# Patient Record
Sex: MALE | Race: WHITE | Employment: OTHER | URBAN - METROPOLITAN AREA
[De-identification: names, ages, dates, MRNs, and addresses within clinical notes are randomized per-mention and may not be internally consistent; named-entity substitution may affect disease eponyms.]

---

## 2019-03-17 ENCOUNTER — TRANSFERRED RECORDS (OUTPATIENT)
Dept: HEALTH INFORMATION MANAGEMENT | Facility: CLINIC | Age: 64
End: 2019-03-17

## 2019-03-28 ENCOUNTER — TRANSFERRED RECORDS (OUTPATIENT)
Dept: HEALTH INFORMATION MANAGEMENT | Facility: CLINIC | Age: 64
End: 2019-03-28

## 2019-05-15 ENCOUNTER — TRANSFERRED RECORDS (OUTPATIENT)
Dept: HEALTH INFORMATION MANAGEMENT | Facility: CLINIC | Age: 64
End: 2019-05-15

## 2019-05-18 ENCOUNTER — TRANSFERRED RECORDS (OUTPATIENT)
Dept: HEALTH INFORMATION MANAGEMENT | Facility: CLINIC | Age: 64
End: 2019-05-18

## 2019-05-19 ENCOUNTER — TRANSFERRED RECORDS (OUTPATIENT)
Dept: HEALTH INFORMATION MANAGEMENT | Facility: CLINIC | Age: 64
End: 2019-05-19

## 2019-05-20 ENCOUNTER — TRANSFERRED RECORDS (OUTPATIENT)
Dept: HEALTH INFORMATION MANAGEMENT | Facility: CLINIC | Age: 64
End: 2019-05-20

## 2019-05-21 ENCOUNTER — TRANSFERRED RECORDS (OUTPATIENT)
Dept: HEALTH INFORMATION MANAGEMENT | Facility: CLINIC | Age: 64
End: 2019-05-21

## 2019-05-22 ENCOUNTER — TRANSFERRED RECORDS (OUTPATIENT)
Dept: HEALTH INFORMATION MANAGEMENT | Facility: CLINIC | Age: 64
End: 2019-05-22

## 2019-05-23 ENCOUNTER — TRANSFERRED RECORDS (OUTPATIENT)
Dept: HEALTH INFORMATION MANAGEMENT | Facility: CLINIC | Age: 64
End: 2019-05-23

## 2019-05-24 ENCOUNTER — TRANSFERRED RECORDS (OUTPATIENT)
Dept: HEALTH INFORMATION MANAGEMENT | Facility: CLINIC | Age: 64
End: 2019-05-24

## 2019-05-28 ENCOUNTER — TRANSFERRED RECORDS (OUTPATIENT)
Dept: HEALTH INFORMATION MANAGEMENT | Facility: CLINIC | Age: 64
End: 2019-05-28

## 2019-05-30 ENCOUNTER — TRANSFERRED RECORDS (OUTPATIENT)
Dept: HEALTH INFORMATION MANAGEMENT | Facility: CLINIC | Age: 64
End: 2019-05-30

## 2019-05-31 ENCOUNTER — TRANSFERRED RECORDS (OUTPATIENT)
Dept: HEALTH INFORMATION MANAGEMENT | Facility: CLINIC | Age: 64
End: 2019-05-31

## 2019-06-25 ENCOUNTER — TRANSFERRED RECORDS (OUTPATIENT)
Dept: HEALTH INFORMATION MANAGEMENT | Facility: CLINIC | Age: 64
End: 2019-06-25

## 2019-07-17 ENCOUNTER — TRANSFERRED RECORDS (OUTPATIENT)
Dept: HEALTH INFORMATION MANAGEMENT | Facility: CLINIC | Age: 64
End: 2019-07-17

## 2019-07-18 NOTE — TELEPHONE ENCOUNTER
ONCOLOGY INTAKE: Records Information      APPT INFORMATION:  Referring provider:  self  Referring provider s clinic:  n/a  Reason for visit/diagnosis:  Prostate Cancer  Has patient been notified of appointment date and time?: Yes  RECORDS INFORMATION:  Were the records received with the referral (via Rightfax)? No    Has patient been seen for any external appt for this diagnosis? Yes    If yes, where? [facility]    Has patient had any imaging or procedures outside of [facility] for this condition? Yes      If Yes, where? [facility]    ADDITIONAL INFORMATION:  Waiting for fax so Chad can be put on the high priority wait list. He also had radiation here with Dr. Kerr.

## 2019-07-19 ENCOUNTER — TRANSFERRED RECORDS (OUTPATIENT)
Dept: HEALTH INFORMATION MANAGEMENT | Facility: CLINIC | Age: 64
End: 2019-07-19

## 2019-07-19 NOTE — TELEPHONE ENCOUNTER
RECORDS STATUS - ALL OTHER DIAGNOSIS      RECORDS RECEIVED FROM: [facility]   DATE RECEIVED: 08/02/2019   NOTES STATUS DETAILS   OFFICE NOTE from referring provider YES SENT TO SCANNING   OFFICE NOTE from medical oncologist YES    DISCHARGE SUMMARY from hospital NA    DISCHARGE REPORT from the ER NA    OPERATIVE REPORT NA    MEDICATION LIST YES SENT TO SCANNING   CLINICAL TRIAL TREATMENTS TO DATE NA    LABS YES    PATHOLOGY REPORTS PENDING    ANYTHING RELATED TO DIAGNOSIS NA    GENONOMIC TESTING NA    TYPE:     IMAGING (NEED IMAGES & REPORT) YES    CT SCANS YES PACS   MRI YES PACS   MAMMO NA    ULTRASOUND YES PACS   PET YES PACS     Action VERONICA   Action Taken REQUEST SENT FOR MR AND IMAGING AND BX. CDK

## 2019-07-22 ENCOUNTER — TRANSFERRED RECORDS (OUTPATIENT)
Dept: HEALTH INFORMATION MANAGEMENT | Facility: CLINIC | Age: 64
End: 2019-07-22

## 2019-07-23 ENCOUNTER — TRANSFERRED RECORDS (OUTPATIENT)
Dept: HEALTH INFORMATION MANAGEMENT | Facility: CLINIC | Age: 64
End: 2019-07-23

## 2019-07-26 ENCOUNTER — DOCUMENTATION ONLY (OUTPATIENT)
Dept: ONCOLOGY | Facility: CLINIC | Age: 64
End: 2019-07-26

## 2019-07-26 NOTE — PROGRESS NOTES
Patient saw Dr. Sherwood in 2013 and requested to see him again.    I scheduled him with Dr. Sherwood  on 9/4/19. Do I need to reschedule him to see someone else?  I did offer other doctors, but he was very adamant that he wanted to see Dr. Sherwood.    Please let me know what you would like me to do.

## 2019-08-01 ENCOUNTER — PRE VISIT (OUTPATIENT)
Dept: ONCOLOGY | Facility: CLINIC | Age: 64
End: 2019-08-01

## 2019-08-01 NOTE — TELEPHONE ENCOUNTER
ONCOLOGY INTAKE: Records Information      APPT INFORMATION:  Referring provider:  SHIVA  Referring provider s clinic:  NA  Reason for visit/diagnosis:  2nd opinion prostate cancer  Has patient been notified of appointment date and time?: Per PT    RECORDS INFORMATION:  Were the records received with the referral (via Rightfax)? No    Has patient been seen for any external appt for this diagnosis? Per PT:    [facility]    Urology [facility]  Surgeries from side affects from [facility] - Prostate surgery  [place]    Previous PT of [facility]  ADDITIONAL INFORMATION:  NA

## 2019-08-02 ENCOUNTER — TRANSFERRED RECORDS (OUTPATIENT)
Dept: HEALTH INFORMATION MANAGEMENT | Facility: CLINIC | Age: 64
End: 2019-08-02

## 2019-08-05 ENCOUNTER — TRANSFERRED RECORDS (OUTPATIENT)
Dept: HEALTH INFORMATION MANAGEMENT | Facility: CLINIC | Age: 64
End: 2019-08-05

## 2019-08-07 ENCOUNTER — ONCOLOGY VISIT (OUTPATIENT)
Dept: ONCOLOGY | Facility: CLINIC | Age: 64
End: 2019-08-07
Attending: INTERNAL MEDICINE
Payer: MEDICARE

## 2019-08-07 VITALS
RESPIRATION RATE: 16 BRPM | BODY MASS INDEX: 29.78 KG/M2 | DIASTOLIC BLOOD PRESSURE: 78 MMHG | HEIGHT: 70 IN | TEMPERATURE: 98.3 F | WEIGHT: 208 LBS | SYSTOLIC BLOOD PRESSURE: 190 MMHG | HEART RATE: 85 BPM | OXYGEN SATURATION: 94 %

## 2019-08-07 DIAGNOSIS — C61 MALIGNANT NEOPLASM OF PROSTATE (H): Primary | ICD-10-CM

## 2019-08-07 DIAGNOSIS — C79.51 BONE METASTASIS: ICD-10-CM

## 2019-08-07 PROBLEM — S41.119A: Status: ACTIVE | Noted: 2018-08-19

## 2019-08-07 PROCEDURE — G0463 HOSPITAL OUTPT CLINIC VISIT: HCPCS | Mod: ZF

## 2019-08-07 PROCEDURE — 99205 OFFICE O/P NEW HI 60 MIN: CPT | Mod: ZP | Performed by: INTERNAL MEDICINE

## 2019-08-07 RX ORDER — LORAZEPAM 1 MG/1
0.5 TABLET ORAL EVERY 6 HOURS PRN
COMMUNITY

## 2019-08-07 RX ORDER — CARISOPRODOL 250 MG/1
250 TABLET ORAL 4 TIMES DAILY PRN
COMMUNITY

## 2019-08-07 RX ORDER — ALBUTEROL SULFATE 90 UG/1
2 AEROSOL, METERED RESPIRATORY (INHALATION)
COMMUNITY
Start: 2018-01-15

## 2019-08-07 RX ORDER — HYDROCODONE BITARTRATE AND ACETAMINOPHEN 5; 325 MG/1; MG/1
1 TABLET ORAL EVERY 6 HOURS PRN
COMMUNITY

## 2019-08-07 ASSESSMENT — PAIN SCALES - GENERAL: PAINLEVEL: NO PAIN (0)

## 2019-08-07 ASSESSMENT — MIFFLIN-ST. JEOR: SCORE: 1738.7

## 2019-08-07 NOTE — PATIENT INSTRUCTIONS
Colorectal Cancer Screening: During our visit today, we discussed that it is recommended you receive colorectal cancer screening. Please call or make an appointment with your primary care provider to discuss this. You may also call the [facility] scheduling line ([phone]) to set up a colonoscopy appointment.

## 2019-08-07 NOTE — PROGRESS NOTES
"MEDICAL ONCOLOGY CLINIC NOTE    PATIENT NAME: Zacarias Villalta  ENCOUNTER DATE: 8/7/2019  REFERRING PROVIDER: Dr. Matta ([facility], [place], [place])    REASON FOR CURRENT VISIT: Recurrent prostate cancer    HISTORY OF PRESENT ILLNESS:  Mr. Zacarias Villalta is a 64 year old  male referred by Dr. Matta from medical oncology at [facility] in [place], [place] for a diagnosis of recurrent prostate cancer. His oncologic history is as under.     Zacarias has significant symptom burden from the prior local treatments for his prostate cancer.  No clear evidence of symptoms from the recurrent disease including worsening chronic back pain or new areas of bone pain. Energy and appetite have been stable. He denies any unintentional weight changes. No cough or SOB. No recent fevers or infectious symptoms. He is very anxious at baseline and has difficulty talking about \"numbers\", particularly in regard to prognosis or survival. His main reason for seeking today's second opinion consultation visit was to discuss if we had any clinical trials or non-testosterone depleting therapies available for him.     ONCOLOGIC HISTORY:  He was first found to have an elevated PSA of 12 in 2008, in the setting of nocturia with burning. He was treated with antibiotics. He was referred for prostate biopsy later in 2008 when PSA evaristo to 10.3. Biopsy revealed Victoria 3+4 prostate adenocarcinoma, with bilateral involvement and perinodal invasion (clinically T2cN0, per notes). He declined CT and bone scan, but underwent prostatectomy in July 2008, notable for lymphovascular invasion and bilateral seminal vesicle involvement, no extracapsular tumor extension, margins clear, but metastatic carcinoma was found in one right pelvic lymph node ([id number]). PSA after surgery was 1.57 and Lupron was recommended. He did not start this until November 2008. Salvage radiation was done in January 2009. 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 He completed 2 years of Lupron, but did not tolerate well due to side effects including ED. He had evidence of good response based on an undetectable PSA for several years. However, MRI of the spine in May 2019 after a fall showed multilevel abnormal bone marrow signal C2 and T4-5. He was first referred to hematology and work-up did not show any obvious evidence of lymphoma or myeloma. He was then seen by his local oncologist on 7/17/19, at which time PSA was found to be 391. Further work-up included a bone scan on 8/2/19 with multifocal sites of active metastatic disease in the axial skeleton (most prominently in the proximal right humerus and proximal right femur), as well as CT abd/pelvis on 8/5/19 which revealed evidence only of prior radical prostatectomy. Biopsy of L1 on 8/2/19 demonstrated metastatic prostate adenocarcinoma.    Per review of the notes from his most recent oncology visit, it appears that he was not interested in resumption of ADT due to side effects. As such, it was felt that the effectiveness of oral agents such as Zytiga or Xtandi would be lower. Chemotherapy was discussed, but he was not clearly interested in that either.     REVIEW OF SYSTEMS:  14 point ROS negative other than the symptoms noted above in the HPI.    PAST MEDICAL HISTORY:  Active Ambulatory Problems     Diagnosis Date Noted     Erectile dysfunction 03/08/2012     Absent nipple 03/27/2012     Skin tear of upper arm without complication, initial encounter 08/19/2018     Gamekeeper's thumb 08/21/2012     Essential hypertension 08/04/2006     Resolved Ambulatory Problems     Diagnosis Date Noted     No Resolved Ambulatory Problems     No Additional Past Medical History     PAST SURGICAL HISTORY:     Appendectomy in 1972    Radical prostatectomy in 2008    Tendon repair in 2011    SOCIAL HISTORY:   Social History     Tobacco Use     Smoking status: Former  "Smoker     Smokeless tobacco: Never Used   Substance Use Topics     Alcohol use: Not Currently     Drug use: Not Currently   - 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     FAMILY HISTORY:   Father with coronary artery disease. Two brothers with stroke.    ALLERGIES:  Allergies   Allergen Reactions     Bee Venom      Codeine Sulfate      Levofloxacin Itching     Penicillins      CURRENT MEDICATIONS:  Current Outpatient Medications:      albuterol (PROAIR HFA/PROVENTIL HFA/VENTOLIN HFA) 108 (90 Base) MCG/ACT inhaler, Inhale 2 puffs into the lungs, Disp: , Rfl:      Carisoprodol 250 MG TABS, Take 250 mg by mouth 4 times daily as needed, Disp: , Rfl:      Co-Enzyme Q-10 100 MG CAPS, Take 100 mg by mouth, Disp: , Rfl:      FISH OIL, 1 capsule daily., Disp: , Rfl:      hydrochlorothiazide (HYDRODIURIL) 25 MG tablet, Take 25 mg by mouth daily., Disp: , Rfl:      HYDROcodone-acetaminophen (NORCO) 5-325 MG tablet, Take 1 tablet by mouth every 6 hours as needed for severe pain, Disp: , Rfl:      LORazepam (ATIVAN) 1 MG tablet, Take 0.5 mg by mouth every 6 hours as needed for anxiety, Disp: , Rfl:     PHYSICAL EXAMINATION:  Vital signs: BP (!) 190/78   Pulse 85   Temp 98.3  F (36.8  C) (Oral)   Resp 16   Ht 1.784 m (5' 10.25\")   Wt 94.3 kg (208 lb)   SpO2 94%   BMI 29.63 kg/m    ECOG performance status of 0. Fatigue 0.  GENERAL: Well-nourished healthy-appearing man, seated in chair, no acute distress.   HEENT: No icterus, no pallor. Moist mucous membranes. Oropharynx is clear.   NECK: Supple, no JVD/LAD.  LUNGS: Clear to ausculation bilaterally, normal work of breathing.   CARDIOVASCULAR: Regular rate and rhythm, no murmurs, gallops or rubs.   ABDOMEN: Soft, nontender and nondistended, no palpable masses, bowel sounds present.  EXTREMITIES: No cyanosis, no clubbing, no edema.   NEUROLOGIC: Alert and oriented. No focal deficits.  PSYCH: Anxious mood but appropriate affect.    LABORATORY DATA: " "  .85 on 7/17/19 (Clear)    Surgical Pathology from L1 biopsy, 8/2/19 (done locally):  - Metastatic prostate adenocarcinoma    IMAGING STUDIES:  Reviewed imaging obtained locally, most recently on 8/2/19 and 8/5/19.  - NM Bone scan notable for multifocal metastatic disease, primarily the proximal right humerus and proximal right femur.  - CT abd/pelvis did not show any evidence of distant metastatic disease.    ASSESSMENT AND PLAN:  Mr. Zacarias Villalta is a 64 year old gentleman, who presents for a second opinion for his metastatic prostate cancer diagnosis.  - He was diagnosed initially with metastatic prostate cancer involving a right inguinal lymph node in 2008, s/p radical prostatectomy with Nye 3+4 prostate adenocarcinoma. He had salvage radiation in 2009. He completed 2 years of ADT with Lupron, and had a good response with PSA decline for several years, but overall tolerated this poorly due to side effects of ED. Recently found to have biopsy proven recurrence with multifocal metastatic disease involving the axial skeleton and PSA elevated to 394.    - I reviewed the available diagnostic data that strongly suggests that this represents an incurable malignancy.    - Reviewed the treatment options including androgen deprivation therapy (ADT) alone versus in combination with chemotherapy or novel antiandrogens. Also reviewed the rationale behind need for systemic therapy at length, with specific focus on its ability to prevent and/or delay symptomatic deterioration for many months if not years to come.     - Patient had many excellent questions regarding his prior experience with ADT in the context of concurrent pelvic radiation. It appears that he has misattributed many of these adverse events to ADT.     - I also addressed his concern regarding a drop in testosterone could significantly take away from his \"masculinity.\" This is simply not true as men while on ADT continue to have excellent " overall status and with due care, can retain muscle mass etc. Sexual function can be affected significantly with ADT, but he reports minimal sexual desire and function due to prior treatments even at baseline.    - Side effects of ADT and abiraterone explained in detail as patient absolutely doesn't want chemotherapy at this time. We do not have a trial available for this disease setting currently.    - He has agreed to explore one of the following three options - degarelix alone, degarelix plus abiraterone (starting 2 months after degarelix to ensure that any side effects are appropriately attributed), versus abiraterone alone. The data for the third option comes from a [facility] series presented at ASCO meeting 2019 by BRI Grajeda MD where patients did obtain sufficient serum testosterone reduction and clinical responses with abiraterone alone.    - He was encouraged to go back and meet his primary medical oncologist to start treatment ASAP. He declined to do so, but agreed to meet his PCP next week to obtain a referral for a different medical oncologist in [place].    - We will plan to send this note to his PCP's office but patient did narrate back an excellent understanding of my recommendations including the need to start anticancer therapy within the next few days.      He was given our contact information should a need arise to see me back in the future. All questions were answered and he is in complete agreement with the plan.     BILLIN. 1 hour spent in this encounter including 35 mins face-to-face time.    Iron Quinones M.D.  . Professor of Medicine  Genitourinary Oncology  Division of Hematology, Oncology & Transplantation  [facility]

## 2019-08-07 NOTE — LETTER
"8/7/2019       RE: Zacarias Villalta  [address]  [place] [address]     Dear Colleague,    Thank you for referring your patient, Zacarias Villalta, to the [facility]. Please see a copy of my visit note below.    MEDICAL ONCOLOGY CLINIC NOTE    PATIENT NAME: Zacarias Villalta  ENCOUNTER DATE: 8/7/2019  REFERRING PROVIDER: Dr. Matta ([facility], [place], [place])    REASON FOR CURRENT VISIT: Recurrent prostate cancer    HISTORY OF PRESENT ILLNESS:  Mr. Zacarias Villalta is a 64 year old  male referred by Dr. Matta from medical oncology at [facility] in [place], [place] for a diagnosis of recurrent prostate cancer. His oncologic history is as under.     Zacarias has significant symptom burden from the prior local treatments for his prostate cancer.  No clear evidence of symptoms from the recurrent disease including worsening chronic back pain or new areas of bone pain. Energy and appetite have been stable. He denies any unintentional weight changes. No cough or SOB. No recent fevers or infectious symptoms. He is very anxious at baseline and has difficulty talking about \"numbers\", particularly in regard to prognosis or survival. His main reason for seeking today's second opinion consultation visit was to discuss if we had any clinical trials or non-testosterone depleting therapies available for him.     ONCOLOGIC HISTORY:  He was first found to have an elevated PSA of 12 in 2008, in the setting of nocturia with burning. He was treated with antibiotics. He was referred for prostate biopsy later in 2008 when PSA evaristo to 10.3. Biopsy revealed Naty 3+4 prostate adenocarcinoma, with bilateral involvement and perinodal invasion (clinically T2cN0, per notes). He declined CT and bone scan, but underwent prostatectomy in July 2008, notable for lymphovascular invasion and bilateral seminal vesicle involvement, no extracapsular tumor extension, margins clear, but metastatic carcinoma was found " in one right pelvic lymph node ([id number]). PSA after surgery was 1.57 and Lupron was recommended. He did not start this until November 2008. Salvage radiation was done in January 2009. 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 He completed 2 years of Lupron, but did not tolerate well due to side effects including ED. He had evidence of good response based on an undetectable PSA for several years. However, MRI of the spine in May 2019 after a fall showed multilevel abnormal bone marrow signal C2 and T4-5. He was first referred to hematology and work-up did not show any obvious evidence of lymphoma or myeloma. He was then seen by his local oncologist on 7/17/19, at which time PSA was found to be 391. Further work-up included a bone scan on 8/2/19 with multifocal sites of active metastatic disease in the axial skeleton (most prominently in the proximal right humerus and proximal right femur), as well as CT abd/pelvis on 8/5/19 which revealed evidence only of prior radical prostatectomy. Biopsy of L1 on 8/2/19 demonstrated metastatic prostate adenocarcinoma.    Per review of the notes from his most recent oncology visit, it appears that he was not interested in resumption of ADT due to side effects. As such, it was felt that the effectiveness of oral agents such as Zytiga or Xtandi would be lower. Chemotherapy was discussed, but he was not clearly interested in that either.     REVIEW OF SYSTEMS:  14 point ROS negative other than the symptoms noted above in the HPI.    PAST MEDICAL HISTORY:  Active Ambulatory Problems     Diagnosis Date Noted     Erectile dysfunction 03/08/2012     Absent nipple 03/27/2012     Skin tear of upper arm without complication, initial encounter 08/19/2018     Gamekeeper's thumb 08/21/2012     Essential hypertension 08/04/2006     Resolved Ambulatory Problems     Diagnosis Date Noted     No Resolved  "Ambulatory Problems     No Additional Past Medical History     PAST SURGICAL HISTORY:     Appendectomy in 1972    Radical prostatectomy in 2008    Tendon repair in 2011    SOCIAL HISTORY:   Social History     Tobacco Use     Smoking status: Former Smoker     Smokeless tobacco: Never Used   Substance Use Topics     Alcohol use: Not Currently     Drug use: Not Currently   - 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     FAMILY HISTORY:   Father with coronary artery disease. Two brothers with stroke.    ALLERGIES:  Allergies   Allergen Reactions     Bee Venom      Codeine Sulfate      Levofloxacin Itching     Penicillins      CURRENT MEDICATIONS:  Current Outpatient Medications:      albuterol (PROAIR HFA/PROVENTIL HFA/VENTOLIN HFA) 108 (90 Base) MCG/ACT inhaler, Inhale 2 puffs into the lungs, Disp: , Rfl:      Carisoprodol 250 MG TABS, Take 250 mg by mouth 4 times daily as needed, Disp: , Rfl:      Co-Enzyme Q-10 100 MG CAPS, Take 100 mg by mouth, Disp: , Rfl:      FISH OIL, 1 capsule daily., Disp: , Rfl:      hydrochlorothiazide (HYDRODIURIL) 25 MG tablet, Take 25 mg by mouth daily., Disp: , Rfl:      HYDROcodone-acetaminophen (NORCO) 5-325 MG tablet, Take 1 tablet by mouth every 6 hours as needed for severe pain, Disp: , Rfl:      LORazepam (ATIVAN) 1 MG tablet, Take 0.5 mg by mouth every 6 hours as needed for anxiety, Disp: , Rfl:     PHYSICAL EXAMINATION:  Vital signs: BP (!) 190/78   Pulse 85   Temp 98.3  F (36.8  C) (Oral)   Resp 16   Ht 1.784 m (5' 10.25\")   Wt 94.3 kg (208 lb)   SpO2 94%   BMI 29.63 kg/m     ECOG performance status of 0. Fatigue 0.  GENERAL: Well-nourished healthy-appearing man, seated in chair, no acute distress.   HEENT: No icterus, no pallor. Moist mucous membranes. Oropharynx is clear.   NECK: Supple, no JVD/LAD.  LUNGS: Clear to ausculation bilaterally, normal work of breathing.   CARDIOVASCULAR: Regular rate and rhythm, no murmurs, gallops or rubs. "   ABDOMEN: Soft, nontender and nondistended, no palpable masses, bowel sounds present.  EXTREMITIES: No cyanosis, no clubbing, no edema.   NEUROLOGIC: Alert and oriented. No focal deficits.  PSYCH: Anxious mood but appropriate affect.    LABORATORY DATA:   .85 on 7/17/19 ([place])    Surgical Pathology from L1 biopsy, 8/2/19 (done locally):  - Metastatic prostate adenocarcinoma    IMAGING STUDIES:  Reviewed imaging obtained locally, most recently on 8/2/19 and 8/5/19.  - NM Bone scan notable for multifocal metastatic disease, primarily the proximal right humerus and proximal right femur.  - CT abd/pelvis did not show any evidence of distant metastatic disease.    ASSESSMENT AND PLAN:  Mr. Zacarias Villalta is a 64 year old gentleman, who presents for a second opinion for his metastatic prostate cancer diagnosis.  - He was diagnosed initially with metastatic prostate cancer involving a right inguinal lymph node in 2008, s/p radical prostatectomy with Morley 3+4 prostate adenocarcinoma. He had salvage radiation in 2009. He completed 2 years of ADT with Lupron, and had a good response with PSA decline for several years, but overall tolerated this poorly due to side effects of ED. Recently found to have biopsy proven recurrence with multifocal metastatic disease involving the axial skeleton and PSA elevated to 394.    - I reviewed the available diagnostic data that strongly suggests that this represents an incurable malignancy.    - Reviewed the treatment options including androgen deprivation therapy (ADT) alone versus in combination with chemotherapy or novel antiandrogens. Also reviewed the rationale behind need for systemic therapy at length, with specific focus on its ability to prevent and/or delay symptomatic deterioration for many months if not years to come.     - Patient had many excellent questions regarding his prior experience with ADT in the context of concurrent pelvic radiation. It appears that he  "has misattributed many of these adverse events to ADT.     - I also addressed his concern regarding a drop in testosterone could significantly take away from his \"masculinity.\" This is simply not true as men while on ADT continue to have excellent overall status and with due care, can retain muscle mass etc. Sexual function can be affected significantly with ADT, but he reports minimal sexual desire and function due to prior treatments even at baseline.    - Side effects of ADT and abiraterone explained in detail as patient absolutely doesn't want chemotherapy at this time. We do not have a trial available for this disease setting currently.    - He has agreed to explore one of the following three options - degarelix alone, degarelix plus abiraterone (starting 2 months after degarelix to ensure that any side effects are appropriately attributed), versus abiraterone alone. The data for the third option comes from a [facility] series presented at ASCO meeting 2019 by BRI Grajeda MD where patients did obtain sufficient serum testosterone reduction and clinical responses with abiraterone alone.    - He was encouraged to go back and meet his primary medical oncologist to start treatment ASAP. He declined to do so, but agreed to meet his PCP next week to obtain a referral for a different medical oncologist in [place].    - We will plan to send this note to his PCP's office but patient did narrate back an excellent understanding of my recommendations including the need to start anticancer therapy within the next few days.      He was given our contact information should a need arise to see me back in the future. All questions were answered and he is in complete agreement with the plan.     BILLIN. 1 hour spent in this encounter including 35 mins face-to-face time.    Iron Quinones M.D.  . Professor of Medicine  Genitourinary Oncology  Division of Hematology, Oncology & Transplantation  [facility]" [place]      Again, thank you for allowing me to participate in the care of your patient.      Sincerely,    Iron Quinones MD

## 2019-08-07 NOTE — NURSING NOTE
"Oncology Rooming Note    August 7, 2019 4:01 PM   Zacarias Villalta is a 64 year old male who presents for:    Chief Complaint   Patient presents with     Oncology Clinic Visit     New; Prostate Ca     Initial Vitals: BP (!) 190/78   Pulse 85   Temp 98.3  F (36.8  C) (Oral)   Resp 16   Ht 1.784 m (5' 10.25\")   Wt 94.3 kg (208 lb)   SpO2 94%   BMI 29.63 kg/m   Estimated body mass index is 29.63 kg/m  as calculated from the following:    Height as of this encounter: 1.784 m (5' 10.25\").    Weight as of this encounter: 94.3 kg (208 lb). Body surface area is 2.16 meters squared.  No Pain (0) Comment: Data Unavailable   No LMP for male patient.  Allergies reviewed: Yes  Medications reviewed: Yes    Medications: Medication refills not needed today.  Pharmacy name entered into [employer]:    [facility] [address] [place], [place] - [address]  [facility] [place], [place] - [address] [place] [address] [phone]  [facility] - [facility] [address]    Clinical concerns: Prostate Cancer       Misty Gan, SIENNA              "

## 2019-08-07 NOTE — LETTER
Date:August 12, 2019      Patient was self referred, no letter generated. Do not send.        [facility] Health Information

## 2019-08-12 LAB — COPATH REPORT: NORMAL

## 2019-08-12 PROCEDURE — 00000346 ZZHCL STATISTIC REVIEW OUTSIDE SLIDES TC 88321: Performed by: INTERNAL MEDICINE

## 2019-08-13 ENCOUNTER — CARE COORDINATION (OUTPATIENT)
Dept: ONCOLOGY | Facility: CLINIC | Age: 64
End: 2019-08-13

## 2019-08-15 ENCOUNTER — CARE COORDINATION (OUTPATIENT)
Dept: ONCOLOGY | Facility: CLINIC | Age: 64
End: 2019-08-15

## 2019-08-16 ENCOUNTER — TELEPHONE (OUTPATIENT)
Dept: ONCOLOGY | Facility: CLINIC | Age: 64
End: 2019-08-16

## 2019-08-16 NOTE — TELEPHONE ENCOUNTER
"----- Message from Maricel Brown RN sent at 8/15/2019  3:53 PM CDT -----  Regarding: RE: Pt's upcoming orthopedic procedure  Hi -    Thank you. This makes sense and I will call the patient to inform him he should reach out to his PCP and/or his orthopedic surgeon with any further questions regarding this procedure.     Thanks for getting back to me. - Maricel       ----- Message -----  From: Iron Quinones MD  Sent: 8/15/2019   3:12 PM  To: Arianne Yeager, RN, Maricel Brown RN  Subject: RE: Pt's upcoming orthopedic procedure           I am not sure what exactly is needed here. If it's medically necessary in the orthopedician's opinion, they can do a vertebroplasty. Patient is not on medical therapy for prostate cancer and so I can't really \"clear him\" for a surgery.     Becaise I'm not his primary oncologist, it'd be ideal if he works with his PCP to get a local medical oncologist ASAP.     Please let him know. Thanks Maricel.  AR    ----- Message -----  From: Maricel Brown RN  Sent: 8/13/2019   3:33 PM  To: Iron Quinones MD, Arianne Yeager, RN  Subject: Pt's upcoming orthopedic procedure               Hi - Patient states his orthopedic surgeon at the [facility] in [place] is requesting an addendum or letter from you regarding your opinion for the planned vertebroplasty.     I did explain to patient that since it was unlikely to be cancer related, a letter from you should not be necessary but he states his orthopedic surgeon wants Oncology to \"take the lead\" so he is requesting the letter. Patient would also like it to go to his GP.  If this is something you are okay doing, I can fax to appropriate providers.     Thanks - Maricel Brown       "

## 2019-08-31 ENCOUNTER — TRANSFERRED RECORDS (OUTPATIENT)
Dept: HEALTH INFORMATION MANAGEMENT | Facility: CLINIC | Age: 64
End: 2019-08-31

## 2019-09-04 ENCOUNTER — PRE VISIT (OUTPATIENT)
Dept: ONCOLOGY | Facility: CLINIC | Age: 64
End: 2019-09-04

## 2019-11-07 ENCOUNTER — TRANSFERRED RECORDS (OUTPATIENT)
Dept: HEALTH INFORMATION MANAGEMENT | Facility: CLINIC | Age: 64
End: 2019-11-07

## 2019-11-19 ENCOUNTER — TRANSFERRED RECORDS (OUTPATIENT)
Dept: HEALTH INFORMATION MANAGEMENT | Facility: CLINIC | Age: 64
End: 2019-11-19

## 2019-11-21 ENCOUNTER — TRANSFERRED RECORDS (OUTPATIENT)
Dept: HEALTH INFORMATION MANAGEMENT | Facility: CLINIC | Age: 64
End: 2019-11-21

## 2019-11-25 ENCOUNTER — TRANSFERRED RECORDS (OUTPATIENT)
Dept: HEALTH INFORMATION MANAGEMENT | Facility: CLINIC | Age: 64
End: 2019-11-25

## 2019-12-04 ENCOUNTER — TRANSFERRED RECORDS (OUTPATIENT)
Dept: HEALTH INFORMATION MANAGEMENT | Facility: CLINIC | Age: 64
End: 2019-12-04

## 2019-12-13 ENCOUNTER — TRANSFERRED RECORDS (OUTPATIENT)
Dept: HEALTH INFORMATION MANAGEMENT | Facility: CLINIC | Age: 64
End: 2019-12-13

## 2020-01-10 ENCOUNTER — TRANSFERRED RECORDS (OUTPATIENT)
Dept: HEALTH INFORMATION MANAGEMENT | Facility: CLINIC | Age: 65
End: 2020-01-10

## 2020-01-23 ENCOUNTER — TELEPHONE (OUTPATIENT)
Dept: ONCOLOGY | Facility: CLINIC | Age: 65
End: 2020-01-23

## 2020-01-23 NOTE — TELEPHONE ENCOUNTER
Chad called to ask for the name of a  medical oncologist at [facility] in [place], [place]. Chad states that during his visit in August, Dr. Quinones gave him the name of a provider but Chad cannot recall the name.     Informed Chad a message would be sent to Dr. Quinones asking if he can recall the provider's name and writer will follow-up with Chad early next week. Chad verbalized understanding of plan.     Maricel Brown, CHRISN, RN  RN Care Coordinator  Dr. Quinones

## 2020-01-24 ENCOUNTER — TRANSFERRED RECORDS (OUTPATIENT)
Dept: HEALTH INFORMATION MANAGEMENT | Facility: CLINIC | Age: 65
End: 2020-01-24

## 2020-01-28 NOTE — TELEPHONE ENCOUNTER
Spoke with Dr. Quinones. Provider he referred Chad to in August at [facility] was Dr. Maximino Wynn. L/M for Chad with above information.     Maricel Brown, BSN, RN  RN Care Coordinator  Dr. Quinones

## 2020-02-21 ENCOUNTER — TRANSFERRED RECORDS (OUTPATIENT)
Dept: HEALTH INFORMATION MANAGEMENT | Facility: CLINIC | Age: 65
End: 2020-02-21

## 2020-02-27 ENCOUNTER — TRANSFERRED RECORDS (OUTPATIENT)
Dept: HEALTH INFORMATION MANAGEMENT | Facility: CLINIC | Age: 65
End: 2020-02-27

## 2020-03-02 ENCOUNTER — TRANSFERRED RECORDS (OUTPATIENT)
Dept: HEALTH INFORMATION MANAGEMENT | Facility: CLINIC | Age: 65
End: 2020-03-02

## 2020-03-04 ENCOUNTER — TRANSFERRED RECORDS (OUTPATIENT)
Dept: HEALTH INFORMATION MANAGEMENT | Facility: CLINIC | Age: 65
End: 2020-03-04

## 2020-03-27 ENCOUNTER — TRANSFERRED RECORDS (OUTPATIENT)
Dept: HEALTH INFORMATION MANAGEMENT | Facility: CLINIC | Age: 65
End: 2020-03-27

## 2020-04-27 ENCOUNTER — TRANSFERRED RECORDS (OUTPATIENT)
Dept: HEALTH INFORMATION MANAGEMENT | Facility: CLINIC | Age: 65
End: 2020-04-27

## 2020-04-29 ENCOUNTER — TRANSFERRED RECORDS (OUTPATIENT)
Dept: HEALTH INFORMATION MANAGEMENT | Facility: CLINIC | Age: 65
End: 2020-04-29

## 2020-05-08 ENCOUNTER — TRANSFERRED RECORDS (OUTPATIENT)
Dept: HEALTH INFORMATION MANAGEMENT | Facility: CLINIC | Age: 65
End: 2020-05-08

## 2020-05-15 ENCOUNTER — TRANSFERRED RECORDS (OUTPATIENT)
Dept: HEALTH INFORMATION MANAGEMENT | Facility: CLINIC | Age: 65
End: 2020-05-15

## 2020-05-28 ENCOUNTER — TRANSFERRED RECORDS (OUTPATIENT)
Dept: HEALTH INFORMATION MANAGEMENT | Facility: CLINIC | Age: 65
End: 2020-05-28

## 2020-06-01 ENCOUNTER — TRANSFERRED RECORDS (OUTPATIENT)
Dept: HEALTH INFORMATION MANAGEMENT | Facility: CLINIC | Age: 65
End: 2020-06-01

## 2020-06-16 ENCOUNTER — TRANSFERRED RECORDS (OUTPATIENT)
Dept: HEALTH INFORMATION MANAGEMENT | Facility: CLINIC | Age: 65
End: 2020-06-16

## 2020-07-07 ENCOUNTER — TRANSFERRED RECORDS (OUTPATIENT)
Dept: HEALTH INFORMATION MANAGEMENT | Facility: CLINIC | Age: 65
End: 2020-07-07

## 2020-09-08 ENCOUNTER — TRANSFERRED RECORDS (OUTPATIENT)
Dept: HEALTH INFORMATION MANAGEMENT | Facility: CLINIC | Age: 65
End: 2020-09-08

## 2020-12-08 ENCOUNTER — TRANSFERRED RECORDS (OUTPATIENT)
Dept: HEALTH INFORMATION MANAGEMENT | Facility: CLINIC | Age: 65
End: 2020-12-08

## 2021-09-09 ENCOUNTER — TRANSFERRED RECORDS (OUTPATIENT)
Dept: HEALTH INFORMATION MANAGEMENT | Facility: CLINIC | Age: 66
End: 2021-09-09

## 2023-01-01 ENCOUNTER — TRANSCRIBE ORDERS (OUTPATIENT)
Dept: OTHER | Age: 68
End: 2023-01-01

## 2023-01-01 DIAGNOSIS — K92.2 GASTROINTESTINAL HEMORRHAGE, UNSPECIFIED GASTROINTESTINAL HEMORRHAGE TYPE: ICD-10-CM

## 2023-01-01 DIAGNOSIS — I99.8 ANGIECTASIA: Primary | ICD-10-CM

## 2023-06-09 ENCOUNTER — TRANSFERRED RECORDS (OUTPATIENT)
Dept: HEALTH INFORMATION MANAGEMENT | Facility: CLINIC | Age: 68
End: 2023-06-09

## 2023-06-12 ENCOUNTER — TRANSFERRED RECORDS (OUTPATIENT)
Dept: HEALTH INFORMATION MANAGEMENT | Facility: CLINIC | Age: 68
End: 2023-06-12

## 2023-07-25 ENCOUNTER — TRANSFERRED RECORDS (OUTPATIENT)
Dept: HEALTH INFORMATION MANAGEMENT | Facility: CLINIC | Age: 68
End: 2023-07-25

## 2023-07-26 ENCOUNTER — TRANSFERRED RECORDS (OUTPATIENT)
Dept: HEALTH INFORMATION MANAGEMENT | Facility: CLINIC | Age: 68
End: 2023-07-26

## 2023-07-28 ENCOUNTER — TRANSFERRED RECORDS (OUTPATIENT)
Dept: HEALTH INFORMATION MANAGEMENT | Facility: CLINIC | Age: 68
End: 2023-07-28

## 2023-07-30 ENCOUNTER — TRANSFERRED RECORDS (OUTPATIENT)
Dept: HEALTH INFORMATION MANAGEMENT | Facility: CLINIC | Age: 68
End: 2023-07-30
Payer: MEDICARE

## 2023-07-31 ENCOUNTER — TRANSFERRED RECORDS (OUTPATIENT)
Dept: HEALTH INFORMATION MANAGEMENT | Facility: CLINIC | Age: 68
End: 2023-07-31
Payer: MEDICARE

## 2023-07-31 LAB — EJECTION FRACTION: NORMAL %

## 2023-08-07 ENCOUNTER — TRANSFERRED RECORDS (OUTPATIENT)
Dept: HEALTH INFORMATION MANAGEMENT | Facility: CLINIC | Age: 68
End: 2023-08-07
Payer: MEDICARE

## 2023-08-08 ENCOUNTER — TRANSFERRED RECORDS (OUTPATIENT)
Dept: HEALTH INFORMATION MANAGEMENT | Facility: CLINIC | Age: 68
End: 2023-08-08
Payer: MEDICARE

## 2023-08-08 LAB — EJECTION FRACTION: NORMAL %

## 2023-08-09 ENCOUNTER — TRANSFERRED RECORDS (OUTPATIENT)
Dept: HEALTH INFORMATION MANAGEMENT | Facility: CLINIC | Age: 68
End: 2023-08-09
Payer: MEDICARE

## 2023-08-11 ENCOUNTER — TRANSFERRED RECORDS (OUTPATIENT)
Dept: HEALTH INFORMATION MANAGEMENT | Facility: CLINIC | Age: 68
End: 2023-08-11
Payer: MEDICARE

## 2023-08-13 LAB
ALT SERPL-CCNC: 39 U/L (ref 4–33)
AST SERPL-CCNC: 54 U/L (ref 13–39)
CREATININE (EXTERNAL): 0.6 MG/DL (ref 0.7–1.3)
GFR ESTIMATED (EXTERNAL): 104 ML/MIN (ref 60–130)
GLUCOSE (EXTERNAL): 141 MG/DL (ref 70–99)
POTASSIUM (EXTERNAL): 3.6 MMOL/L (ref 3.5–5.1)

## 2023-08-16 ENCOUNTER — TRANSFERRED RECORDS (OUTPATIENT)
Dept: HEALTH INFORMATION MANAGEMENT | Facility: CLINIC | Age: 68
End: 2023-08-16
Payer: MEDICARE

## 2023-08-17 ENCOUNTER — TRANSFERRED RECORDS (OUTPATIENT)
Dept: HEALTH INFORMATION MANAGEMENT | Facility: CLINIC | Age: 68
End: 2023-08-17
Payer: MEDICARE

## 2023-08-21 ENCOUNTER — TRANSCRIBE ORDERS (OUTPATIENT)
Dept: OTHER | Age: 68
End: 2023-08-21

## 2023-08-21 ENCOUNTER — MEDICAL CORRESPONDENCE (OUTPATIENT)
Dept: HEALTH INFORMATION MANAGEMENT | Facility: CLINIC | Age: 68
End: 2023-08-21
Payer: MEDICARE

## 2023-08-21 DIAGNOSIS — K55.20 ANGIODYSPLASIA OF GASTROINTESTINAL TRACT: ICD-10-CM

## 2023-08-21 DIAGNOSIS — D50.9 IRON DEFICIENCY ANEMIA, UNSPECIFIED: Primary | ICD-10-CM

## 2023-08-25 ENCOUNTER — DOCUMENTATION ONLY (OUTPATIENT)
Dept: GASTROENTEROLOGY | Facility: CLINIC | Age: 68
End: 2023-08-25
Payer: MEDICARE

## 2023-08-25 ENCOUNTER — TELEPHONE (OUTPATIENT)
Dept: GASTROENTEROLOGY | Facility: CLINIC | Age: 68
End: 2023-08-25
Payer: MEDICARE

## 2023-08-25 ENCOUNTER — MEDICAL CORRESPONDENCE (OUTPATIENT)
Dept: HEALTH INFORMATION MANAGEMENT | Facility: CLINIC | Age: 68
End: 2023-08-25
Payer: MEDICARE

## 2023-08-25 NOTE — PROGRESS NOTES
Faxed urgent records request to [facility] per request.    Records Requested:  -- Discharge Paperwork  -- Procedure Reports  -- Capsule Endoscopy Study    Facility Information:  [place] [address]   Phone #: [phone]   Medical Records Fax #: [phone]      SK

## 2023-08-25 NOTE — TELEPHONE ENCOUNTER
[facility]     Referring provider: [facility]    Referred to: [facility]     Provider Requested: na     Referral Received: 8/21/23     Records received: media tab     Images received: capsule being delivered to Dr. Maciel's office 9-6-23    Evaluation for: balloon enteroscopy     Clinical History (per RN review):       Progress note from Mariano    68-year-old with past medical history of metastatic prostate cancer follows with Dr. Matta along with heart failure and preserved EF presenting with progressive fatigue and weakness.  Recent history of hospitalization for anemia.  EGD and colonoscopy were negative for stigmata of bleeding.  Capsule endoscopy on June 9 showed angiectasia's in proximal small bowel without active bleeding along with 1 AVM.  Push enteroscopy and colonoscopy performed inpatient.  Small bowel enteroscopy revealed an AVM in proximal (unknown, says ejection in dictation) but actually oozing blood treated with APC.  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  Additionally a large AVM and transverse colon consistent with stigmata of bleeding.  Status post APC.  If continues to bleed will need referral to [facility] for deep enteroscopy versus trial octreotide.  On PPI 40 mg daily indefinitely.    Capsule images being delivered, no specific capsule report received. Push enteroscopy report detailed below    Component 07/25/23 07/10/23 07/05/23 07/04/23 07/03/23 06/01/23   WBC 7.9 9.1 -- 7.5 7.7 --   RBC 2.57 Low  3.22 Low  -- 2.62 Low  2.28 Low  --   Hemoglobin 7.8 Low  9.6 Low  8.8 Low  8.0 Low  7.0 Low Panic  7.8 Low    Hematocrit 24.8 Low  30.7 Low  -- 25.4 Low  22.6 Low  --     Small bowel enteroscopy completed 7/28/23- read and photos in Media tab  Findings:    A single angioectasia with bleeding was found in the proximal jejunum.  Coagulation for hemostasis using argon beam at 1 L/minute and 20 W was  successful.    There was no evidence of significant pathology in the entire examined duodenum.    A single 3 mm angiectasia with no bleeding was found in the gastric body.  Coagulation for hemostasis using argon plasma at 1 L/min at 20 W was successful    MD review date: 09/06/23    MD Decision for clinic consultation/Orders:            Referral updates/Patient contacted:   08/25/23: called patient with update, reviewed we are waiting for capsule images.

## 2023-08-28 NOTE — PROGRESS NOTES
Called [facility] [place] to follow-up on request. Provided fax number and direct dial.     [employer] representative reported that they would work on request ASAP.     Records Requested:  -- Discharge Paperwork  -- Procedure Reports  -- Capsule Endoscopy Study     Facility Information:  [place] [address]   Phone #: [phone]   Medical Records Fax #: [phone]        SK

## 2023-08-30 NOTE — PROGRESS NOTES
Called [facility] to follow-up on request for copies of capsule endoscopy study.      Provided mailing address, phone number, and fax number. Clarified that we need in real time study burned onto disc or thumb-drive and mailed out.    Will follow-up as needed.     Records Requested:  -- Discharge Paperwork  -- Procedure Reports  -- Capsule Endoscopy Study     Facility Information:  [place] [address]   Phone #: [phone]   Medical Records Fax #: [phone]  GI Phone #: [phone]        SK

## 2023-08-30 NOTE — PROGRESS NOTES
Called [facility] to follow-up on request for copies of capsule endoscopy study.     Left VM with direct dial number.     Records Requested:  -- Discharge Paperwork  -- Procedure Reports  -- Capsule Endoscopy Study     Facility Information:  [address] [address]   Phone #: [phone]   Medical Records Fax #: [phone]        SK

## 2023-09-01 NOTE — PROGRESS NOTES
Called [facility] to follow-up on request for copies of capsule endoscopy study.  confirmed that request had been fulfilled and placed in mail on 8-30-23.     Records Requested:  -- Discharge Paperwork  -- Procedure Reports  -- Capsule Endoscopy Study     Facility Information:  [place] [address]   Phone #: [phone]   Medical Records Fax #: [phone]  GI Phone #: [phone]        SK

## 2023-09-06 NOTE — PROGRESS NOTES
Called [facility] to follow-up on request for copies of capsule endoscopy study report. Provided fax number.     Will follow-up as needed.     Records Requested:  -- Discharge Paperwork  -- Procedure Reports  -- Capsule Endoscopy Study     Facility Information:  [place] [address]   Phone #: [phone]   Medical Records Fax #: [phone]  GI Phone #: [phone]        SK

## 2023-09-06 NOTE — TELEPHONE ENCOUNTER
Per Dr. Maciel  Please tell them the most reasonable thing to do is repeat push +/- a colon to ensure they treated the ones they can reach sufficiently     Patient called back, reviewed plan for procedure locally, repeat enteroscopy +/- colonoscopy. Pt understands plan. Patient has follow up with Dr Renteria this week 9/8    Referred by: [place] [address]  Ph: [phone] Fx: [phone]    Talked to Earlene in Dr Lee office, reviewed recommendations above.    ML